# Patient Record
Sex: FEMALE | Race: WHITE | Employment: UNEMPLOYED | URBAN - METROPOLITAN AREA
[De-identification: names, ages, dates, MRNs, and addresses within clinical notes are randomized per-mention and may not be internally consistent; named-entity substitution may affect disease eponyms.]

---

## 2020-01-13 ENCOUNTER — HOSPITAL ENCOUNTER (EMERGENCY)
Facility: HOSPITAL | Age: 50
Discharge: HOME | End: 2020-01-13
Attending: EMERGENCY MEDICINE
Payer: COMMERCIAL

## 2020-01-13 ENCOUNTER — APPOINTMENT (OUTPATIENT)
Dept: CT IMAGING | Facility: HOSPITAL | Age: 50
End: 2020-01-13
Attending: EMERGENCY MEDICINE
Payer: COMMERCIAL

## 2020-01-13 VITALS
RESPIRATION RATE: 18 BRPM | HEART RATE: 69 BPM | TEMPERATURE: 98 F | OXYGEN SATURATION: 96 % | DIASTOLIC BLOOD PRESSURE: 76 MMHG | SYSTOLIC BLOOD PRESSURE: 128 MMHG | WEIGHT: 230 LBS

## 2020-01-13 DIAGNOSIS — R41.3 MEMORY CHANGE: Primary | ICD-10-CM

## 2020-01-13 LAB
ALBUMIN SERPL-MCNC: 3.4 G/DL (ref 3.4–5)
ALBUMIN/GLOB SERPL: 1.1 {RATIO} (ref 1–2)
ALP LIVER SERPL-CCNC: 55 U/L (ref 39–100)
ALT SERPL-CCNC: 59 U/L (ref 13–56)
AMPHET UR QL SCN: NEGATIVE
ANION GAP SERPL CALC-SCNC: 5 MMOL/L (ref 0–18)
AST SERPL-CCNC: 30 U/L (ref 15–37)
ATRIAL RATE: 62 BPM
BARBITURATES UR QL SCN: NEGATIVE
BASOPHILS # BLD AUTO: 0.04 X10(3) UL (ref 0–0.2)
BASOPHILS NFR BLD AUTO: 0.6 %
BENZODIAZ UR QL SCN: NEGATIVE
BILIRUB SERPL-MCNC: 0.2 MG/DL (ref 0.1–2)
BILIRUB UR QL STRIP.AUTO: NEGATIVE
BUN BLD-MCNC: 17 MG/DL (ref 7–18)
BUN/CREAT SERPL: 16.2 (ref 10–20)
CALCIUM BLD-MCNC: 8.9 MG/DL (ref 8.5–10.1)
CANNABINOIDS UR QL SCN: NEGATIVE
CHLORIDE SERPL-SCNC: 109 MMOL/L (ref 98–112)
CO2 SERPL-SCNC: 28 MMOL/L (ref 21–32)
COCAINE UR QL: NEGATIVE
CREAT BLD-MCNC: 1.05 MG/DL (ref 0.55–1.02)
CREAT UR-SCNC: 290 MG/DL
DEPRECATED RDW RBC AUTO: 43.6 FL (ref 35.1–46.3)
EOSINOPHIL # BLD AUTO: 0.04 X10(3) UL (ref 0–0.7)
EOSINOPHIL NFR BLD AUTO: 0.6 %
ERYTHROCYTE [DISTWIDTH] IN BLOOD BY AUTOMATED COUNT: 12.7 % (ref 11–15)
ETHANOL UR-MCNC: NEGATIVE MG/DL
EXPIRATION DATE: NORMAL
GLOBULIN PLAS-MCNC: 3.2 G/DL (ref 2.8–4.4)
GLUCOSE BLD-MCNC: 87 MG/DL (ref 70–99)
GLUCOSE UR STRIP.AUTO-MCNC: NEGATIVE MG/DL
HCT VFR BLD AUTO: 39.3 % (ref 35–48)
HGB BLD-MCNC: 12.9 G/DL (ref 12–16)
HYALINE CASTS #/AREA URNS AUTO: PRESENT /LPF
IMM GRANULOCYTES # BLD AUTO: 0.01 X10(3) UL (ref 0–1)
IMM GRANULOCYTES NFR BLD: 0.1 %
LYMPHOCYTES # BLD AUTO: 1.72 X10(3) UL (ref 1–4)
LYMPHOCYTES NFR BLD AUTO: 25.4 %
M PROTEIN MFR SERPL ELPH: 6.6 G/DL (ref 6.4–8.2)
MCH RBC QN AUTO: 30.8 PG (ref 26–34)
MCHC RBC AUTO-ENTMCNC: 32.8 G/DL (ref 31–37)
MCV RBC AUTO: 93.8 FL (ref 80–100)
MONOCYTES # BLD AUTO: 0.92 X10(3) UL (ref 0.1–1)
MONOCYTES NFR BLD AUTO: 13.6 %
NEUTROPHILS # BLD AUTO: 4.03 X10 (3) UL (ref 1.5–7.7)
NEUTROPHILS # BLD AUTO: 4.03 X10(3) UL (ref 1.5–7.7)
NEUTROPHILS NFR BLD AUTO: 59.7 %
NITRITE UR QL STRIP.AUTO: NEGATIVE
OPIATES UR QL SCN: NEGATIVE
OSMOLALITY SERPL CALC.SUM OF ELEC: 295 MOSM/KG (ref 275–295)
P AXIS: 50 DEGREES
P-R INTERVAL: 166 MS
PCP UR QL SCN: NEGATIVE
PH UR STRIP.AUTO: 5 [PH] (ref 4.5–8)
PLATELET # BLD AUTO: 157 10(3)UL (ref 150–450)
POCT LOT NUMBER: NORMAL
POCT URINE PREGNANCY: NEGATIVE
POTASSIUM SERPL-SCNC: 4.4 MMOL/L (ref 3.5–5.1)
PROT UR STRIP.AUTO-MCNC: NEGATIVE MG/DL
Q-T INTERVAL: 386 MS
QRS DURATION: 76 MS
QTC CALCULATION (BEZET): 391 MS
R AXIS: 46 DEGREES
RBC # BLD AUTO: 4.19 X10(6)UL (ref 3.8–5.3)
RBC UR QL AUTO: NEGATIVE
SODIUM SERPL-SCNC: 142 MMOL/L (ref 136–145)
SP GR UR STRIP.AUTO: 1.03 (ref 1–1.03)
T AXIS: 49 DEGREES
TSI SER-ACNC: 1.1 MIU/ML (ref 0.36–3.74)
UROBILINOGEN UR STRIP.AUTO-MCNC: 2 MG/DL
VALPROATE SERPL-MCNC: 70.8 UG/ML (ref 50–100)
VENTRICULAR RATE: 62 BPM
WBC # BLD AUTO: 6.8 X10(3) UL (ref 4–11)

## 2020-01-13 PROCEDURE — 80307 DRUG TEST PRSMV CHEM ANLYZR: CPT | Performed by: EMERGENCY MEDICINE

## 2020-01-13 PROCEDURE — 85025 COMPLETE CBC W/AUTO DIFF WBC: CPT | Performed by: EMERGENCY MEDICINE

## 2020-01-13 PROCEDURE — 84443 ASSAY THYROID STIM HORMONE: CPT | Performed by: EMERGENCY MEDICINE

## 2020-01-13 PROCEDURE — 70450 CT HEAD/BRAIN W/O DYE: CPT | Performed by: EMERGENCY MEDICINE

## 2020-01-13 PROCEDURE — 80164 ASSAY DIPROPYLACETIC ACD TOT: CPT | Performed by: EMERGENCY MEDICINE

## 2020-01-13 PROCEDURE — 99284 EMERGENCY DEPT VISIT MOD MDM: CPT

## 2020-01-13 PROCEDURE — 80053 COMPREHEN METABOLIC PANEL: CPT | Performed by: EMERGENCY MEDICINE

## 2020-01-13 PROCEDURE — 99285 EMERGENCY DEPT VISIT HI MDM: CPT

## 2020-01-13 PROCEDURE — 36415 COLL VENOUS BLD VENIPUNCTURE: CPT

## 2020-01-13 PROCEDURE — 93005 ELECTROCARDIOGRAM TRACING: CPT

## 2020-01-13 PROCEDURE — 81025 URINE PREGNANCY TEST: CPT

## 2020-01-13 PROCEDURE — 93010 ELECTROCARDIOGRAM REPORT: CPT

## 2020-01-13 PROCEDURE — 81001 URINALYSIS AUTO W/SCOPE: CPT | Performed by: EMERGENCY MEDICINE

## 2020-01-13 RX ORDER — PAROXETINE HYDROCHLORIDE 20 MG/1
10 TABLET, FILM COATED ORAL EVERY MORNING
COMMUNITY

## 2020-01-13 RX ORDER — METOPROLOL SUCCINATE 50 MG/1
50 TABLET, EXTENDED RELEASE ORAL DAILY
COMMUNITY

## 2020-01-13 RX ORDER — DIVALPROEX SODIUM 500 MG/1
500 TABLET, DELAYED RELEASE ORAL 3 TIMES DAILY
COMMUNITY

## 2020-01-13 RX ORDER — OMEPRAZOLE 20 MG/1
20 CAPSULE, DELAYED RELEASE ORAL
COMMUNITY

## 2020-01-13 NOTE — ED PROVIDER NOTES
Patient Seen in: [facility] Emergency Department      History   Patient presents with:  Neurologic Problem    Stated Complaint: Pt c/o tremmors & forgetfulness \"off and on\" for 9 months.  patient is currently*    HPI    Patient 68-year-old female w air)       Current:/76   Pulse 69   Temp 98.4 °F (36.9 °C) (Temporal)   Resp 18   Wt 104.3 kg   SpO2 96%         Physical Exam  GENERAL: Patient resting comfortably on the cart in no acute distress.   HEENT: Extraocular muscles intact, pupils equal ro CBC W/ DIFFERENTIAL[[id number]]                              Final result                 Please view results for these tests on the individual orders.    DRUG SCREEN 7 W/OUT CONFIRMATION, URINE    Narrative:     Results of the Urine Drug Sc

## 2020-01-13 NOTE — ED INITIAL ASSESSMENT (HPI)
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 \" Patient reports shaking in hands, but also reports with memory problems. AAO x4. Patient was asked to follow up with a neurologist but does not wish to wait.

## 2020-01-15 ENCOUNTER — HOSPITAL ENCOUNTER (EMERGENCY)
Facility: HOSPITAL | Age: 50
Discharge: HOME | End: 2020-01-15
Attending: EMERGENCY MEDICINE

## 2020-01-15 VITALS
TEMPERATURE: 98 F | SYSTOLIC BLOOD PRESSURE: 114 MMHG | HEIGHT: 64 IN | BODY MASS INDEX: 39.14 KG/M2 | RESPIRATION RATE: 16 BRPM | DIASTOLIC BLOOD PRESSURE: 68 MMHG | HEART RATE: 63 BPM | WEIGHT: 229.25 LBS | OXYGEN SATURATION: 94 %

## 2020-01-15 DIAGNOSIS — G51.4 FACIAL TWITCHING: Primary | ICD-10-CM

## 2020-01-15 PROCEDURE — 99282 EMERGENCY DEPT VISIT SF MDM: CPT

## 2020-01-15 NOTE — ED PROVIDER NOTES
Patient Seen in: [facility] Emergency Department      History   Patient presents with: Other    Stated Complaint: FACIAL TWITCHING    HPI    24-year-old female presents emergency room for evaluation of facial twitching.   Patient states over the las above.    Physical Exam     ED Triage Vitals [01/15/20 0820]   /68   Pulse 69   Resp 17   Temp 97.9 °F (36.6 °C)   Temp src Temporal   SpO2 99 %   O2 Device None (Room air)       Current:/68   Pulse 69   Temp 97.9 °F (36.6 °C) (Temporal)   Resp Impression:  Facial twitching  (primary encounter diagnosis)    Disposition:  Discharge  1/15/2020  9:17 am    Follow-up:  Leslie Mcpherson MD  [address] [place] [address]  [phone]    Call in 1 day          Medications Prescri

## 2020-01-15 NOTE — ED INITIAL ASSESSMENT (HPI)
Pt here stating she has had tremors x 9 months intermittently . Was seen in ER several days ago for tremors and given a follow up with neuro. Pt does not live here and stating she will see neuro once she is back to [place]. Today awoke with facial twitching.

## (undated) NOTE — ED AVS SNAPSHOT
Rusty Valentine   MRN: [id number]    Department:  [facility] Emergency Department   Date of Visit:  1/13/2020           Disclosure     Insurance plans vary and the physician(s) referred by the ER may not be covered by your plan.  Please contact your tell this physician (or your personal doctor if your instructions are to return to your personal doctor) about any new or lasting problems. The primary care or specialist physician will see patients referred from the [facility] Emergency Department.  Zhou Mayorga

## (undated) NOTE — ED AVS SNAPSHOT
Aggie Morales   MRN: [id number]    Department:  [facility] Emergency Department   Date of Visit:  1/15/2020           Disclosure     Insurance plans vary and the physician(s) referred by the ER may not be covered by your plan.  Please contact your tell this physician (or your personal doctor if your instructions are to return to your personal doctor) about any new or lasting problems. The primary care or specialist physician will see patients referred from the [facility] Emergency Department.  Shelton Lombard